# Patient Record
Sex: MALE | Race: WHITE
[De-identification: names, ages, dates, MRNs, and addresses within clinical notes are randomized per-mention and may not be internally consistent; named-entity substitution may affect disease eponyms.]

---

## 2018-06-03 ENCOUNTER — HOSPITAL ENCOUNTER (EMERGENCY)
Dept: HOSPITAL 25 - ED | Age: 23
LOS: 1 days | Discharge: HOME | End: 2018-06-04
Payer: COMMERCIAL
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
## 2018-06-03 DIAGNOSIS — S81.812A: Primary | ICD-10-CM

## 2018-06-03 DIAGNOSIS — Z23: ICD-10-CM
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
## 2018-06-03 PROCEDURE — 99282 EMERGENCY DEPT VISIT SF MDM: CPT

## 2018-06-03 PROCEDURE — 90471 IMMUNIZATION ADMIN: CPT

## 2018-06-03 PROCEDURE — 90715 TDAP VACCINE 7 YRS/> IM: CPT

## 2018-06-03 PROCEDURE — 12004 RPR S/N/AX/GEN/TRK7.6-12.5CM: CPT

## 2018-06-04 VITALS — DIASTOLIC BLOOD PRESSURE: 81 MMHG | SYSTOLIC BLOOD PRESSURE: 146 MMHG

## 2018-06-04 NOTE — ED
Laceration/Wound HPI





- HPI Summary


HPI Summary: 





Complains of laceration to left medial shin from rotor tiller blade.  Bleeding 

controlled.  Medical history is none.  Denies loss of sensation or function 

distally.





- History of Current Complaint


Stated Complaint: LT LEG LAC


Time Seen by Provider: 06/03/18 22:45


Hx Obtained From: Patient


Pain Intensity: 9





- Allergy/Home Medications


Allergies/Adverse Reactions: 


 Allergies











Allergy/AdvReac Type Severity Reaction Status Date / Time


 


No Known Allergies Allergy   Verified 06/03/18 22:36














PMH/Surg Hx/FS Hx/Imm Hx


Infectious Disease History: No


Infectious Disease History: 


   Denies: Traveled Outside the [place] in Last 30 Days





- Social History


Alcohol Use: None


Substance Use Type: Reports: None


Smoking Status (MU): Never Smoked Tobacco





Review of Systems


Constitutional: Negative


Eyes: Negative


ENT: Negative


Cardiovascular: Negative


Respiratory: Negative


Gastrointestinal: Negative


Genitourinary: Negative


Musculoskeletal: Negative


Positive: Other


Neurological: Negative


Psychological: Normal


All Other Systems Reviewed And Are Negative: Yes





Physical Exam





- Summary


Physical Exam Summary: 





Laceration to medial left shin.  Dorsiflexion and plantar flexion intact.  

Pulses and sensation intact distally.


Triage Information Reviewed: Yes


Vital Signs On Initial Exam: 


 Initial Vitals











Temp Pulse Resp BP Pulse Ox


 


 98.3 F   73   18   135/85   99 


 


 06/03/18 22:33  06/03/18 22:33  06/03/18 22:33  06/03/18 22:33  06/03/18 22:33











Vital Signs Reviewed: Yes


Appearance: Positive: Well-Appearing


Skin: Positive: Warm


Head/Face: Positive: Normal Head/Face Inspection


Eyes: Positive: Normal


Neck: Positive: Supple


Respiratory/Lung Sounds: Positive: Clear to Auscultation


Cardiovascular: Positive: Normal


Abdomen Description: Positive: Nontender


Musculoskeletal: Positive: Normal


Neurological: Positive: Normal


Psychiatric: Positive: Normal


AVPU Assessment: Alert





- [place] Coma Scale


Best Eye Response: 4 - Spontaneous


Best Motor Response: 6 - Obeys Commands


Best Verbal Response: 5 - Oriented


Coma Scale Total: 15





Procedures





- Laceration/Wound Repair


  ** 1


Location: lower extremity


Description: Linear


Anesthesia: Local, 1.0%


Length, Depth and Shape: 8cmx 1cm


Betadine Prep?: Yes


Irrigated w/ Saline (ccs): 40 - saline plus Betadine


Laceration/Wound Explored: clean


Debridement: minimal


Number of Sutures: 9 - 4. 0 Ethilon


Layer Closure?: No





Diagnostics





- Vital Signs


 Vital Signs











  Temp Pulse Resp BP Pulse Ox


 


 06/03/18 22:33  98.3 F  73  18  135/85  99














- Laboratory


Lab Statement: Any lab studies that have been ordered have been reviewed, and 

results considered in the medical decision making process.





Laceration Repair Course/Dx





- Course


Course Of Treatment: Complains of laceration to left medial shin from rotor 

tiller blade.  Bleeding controlled.  Medical history is none.  Denies loss of 

sensation or function distally.  Laceration to medial left shin.  Dorsiflexion 

and plantar flexion intact.  Pulses and sensation intact distally.  Rx for 

Keflex





- Clinical Impression


Provider Diagnoses: 


 Laceration








Discharge





- Sign-Out/Discharge


Documenting (check all that apply): Discharge/Admit/Transfer





- Discharge Plan


Condition: Stable


Disposition: HOME


Prescriptions: 


Cephalexin CAP* [Keflex CAP*] 500 mg PO TID 10 Days #30 cap


Patient Education Materials:  Care For Your Stitches (ED), Laceration (ED)


Referrals: 


Morenita Valle RN [Primary Care Provider] - 


Additional Instructions: 


Take antibiotics as directed.  Sutures to be removed in 10 days.  May wash with 

warm running water and soap.  Do not submerge underwater for sustained period 

of time.  Return to the ED for any new or worsening symptoms





- Billing Disposition and Condition


Condition: STABLE


Disposition: HOME

## 2018-06-04 NOTE — RAD
INDICATION:  Laceration to left lower leg shin.



TECHNIQUE: 2 views of the left lower leg were obtained.



FINDINGS: The bones are normal alignment. No fracture is seen. No radiopaque foreign body

is seen.



IMPRESSION:  NO FRACTURE OR RADIOPAQUE FOREIGN BODY IS SEEN.